# Patient Record
Sex: FEMALE | Race: WHITE | Employment: FULL TIME | URBAN - METROPOLITAN AREA
[De-identification: names, ages, dates, MRNs, and addresses within clinical notes are randomized per-mention and may not be internally consistent; named-entity substitution may affect disease eponyms.]

---

## 2020-10-26 ENCOUNTER — APPOINTMENT (OUTPATIENT)
Dept: GENERAL RADIOLOGY | Age: 35
End: 2020-10-26
Payer: COMMERCIAL

## 2020-10-26 ENCOUNTER — HOSPITAL ENCOUNTER (EMERGENCY)
Age: 35
Discharge: HOME | End: 2020-10-26
Payer: COMMERCIAL

## 2020-10-26 VITALS
DIASTOLIC BLOOD PRESSURE: 63 MMHG | HEART RATE: 83 BPM | WEIGHT: 165 LBS | HEIGHT: 66 IN | TEMPERATURE: 99 F | SYSTOLIC BLOOD PRESSURE: 128 MMHG | RESPIRATION RATE: 20 BRPM | BODY MASS INDEX: 26.52 KG/M2 | OXYGEN SATURATION: 97 %

## 2020-10-26 PROCEDURE — 6360000002 HC RX W HCPCS: Performed by: NURSE PRACTITIONER

## 2020-10-26 PROCEDURE — 99212 OFFICE O/P EST SF 10 MIN: CPT

## 2020-10-26 PROCEDURE — 96372 THER/PROPH/DIAG INJ SC/IM: CPT

## 2020-10-26 PROCEDURE — 72110 X-RAY EXAM L-2 SPINE 4/>VWS: CPT

## 2020-10-26 RX ORDER — NAPROXEN 500 MG/1
500 TABLET ORAL 2 TIMES DAILY
Qty: 14 TABLET | Refills: 0 | Status: SHIPPED | OUTPATIENT
Start: 2020-10-26 | End: 2022-08-22 | Stop reason: ALTCHOICE

## 2020-10-26 RX ORDER — CYCLOBENZAPRINE HCL 10 MG
10 TABLET ORAL 2 TIMES DAILY PRN
Qty: 10 TABLET | Refills: 0 | Status: SHIPPED | OUTPATIENT
Start: 2020-10-26 | End: 2020-10-31

## 2020-10-26 RX ORDER — KETOROLAC TROMETHAMINE 30 MG/ML
30 INJECTION, SOLUTION INTRAMUSCULAR; INTRAVENOUS ONCE
Status: COMPLETED | OUTPATIENT
Start: 2020-10-26 | End: 2020-10-26

## 2020-10-26 RX ADMIN — KETOROLAC TROMETHAMINE 30 MG: 30 INJECTION, SOLUTION INTRAMUSCULAR; INTRAVENOUS at 19:17

## 2020-10-26 ASSESSMENT — PAIN SCALES - GENERAL
PAINLEVEL_OUTOF10: 9
PAINLEVEL_OUTOF10: 5
PAINLEVEL_OUTOF10: 9

## 2020-10-26 ASSESSMENT — PAIN DESCRIPTION - ORIENTATION: ORIENTATION: LOWER

## 2020-10-26 ASSESSMENT — PAIN DESCRIPTION - LOCATION: LOCATION: BACK

## 2020-10-26 ASSESSMENT — PAIN DESCRIPTION - PAIN TYPE: TYPE: ACUTE PAIN

## 2020-10-26 NOTE — ED PROVIDER NOTES
HPI: Jerica Gould 38 y. o.female with   Past Medical History:   Diagnosis Date    Thyroid disease     who presents with a  lower back pain. The patient states that the back pain began 2 months ago she was sleeping on the floor with her dog that was sick. Mariama Jerez The history is obtained from the patient. . The patient states that the pain is moderate. It is worsened by movement including flexion and extension of the back as well as rotation. Patient denies any distal neurovascular complaints including numbness tingling or weakness. There are no bowel or bladder symptoms reported. The patient denies saddle or groin anesthesia. The patient also denies fevers, chills, weight loss, night sweats, IV drug use, or recent illness.        Review of Systems:   Pertinent positives and negatives are stated within HPI, all other systems reviewed and are negative.        --------------------------------------------- PAST HISTORY ---------------------------------------------  Past Medical History:  has a past medical history of Thyroid disease. Past Surgical History:  has no past surgical history on file. Social History:  reports that she has never smoked. She has never used smokeless tobacco.    Family History: family history is not on file. The patients home medications have been reviewed. Allergies: Augmentin [amoxicillin-pot clavulanate]      ------------------------- NURSING NOTES AND VITALS REVIEWED ---------------------------   The nursing notes within the ED encounter and vital signs as below have been reviewed by myself. /63   Pulse 83   Temp 99 °F (37.2 °C) (Infrared)   Resp 20   Ht 5' 6\" (1.676 m)   Wt 165 lb (74.8 kg)   LMP 10/19/2020   SpO2 97%   BMI 26.63 kg/m²   Oxygen Saturation Interpretation: Normal    The patients available past medical records and past encounters were reviewed. Physical exam:  Constitutional:  The patient is tearful due to the back pain.  Patient is alert and oriented x3. Head: Head is atraumatic and normocephalic. Eyes: There is no discharge from the eyes. Sclerae are normal.  ENT: The oropharynx is normal. The mouth is normal to inspection. Neck: Normal range of motion of the neck is present   Respiratory/chest: No resp distress  Cardiovascular: Regular rate   Skin: Skin is warm and dry,   Neurologic exam: The patient's Kirk Coma Scale is 15. No focal motor deficits. There are no focal sensory deficits. . Gait is normal. Symmetric strength and sensation in the lower extremities bilaterally. Back exam: The patient has no  reproducible tenderness to palpation in the paravertebral lumbar  There is no evidence of localized erythema nor is there any focal warmth to the back. The patient has no evidence of single vertebral tenderness or any single area of interspace tenderness. There are no palpable deformities, lacerations, abrasions, or stepoffs. No midline cervical, thoracic, or lumbar spine tenderness.           -------------------------------------------------- RESULTS -------------------------------------------------  I have personally reviewed all laboratory and imaging results for this patient. Results are listed below. LABS:  No results found for this visit on 10/26/20. RADIOLOGY:  Interpreted by Radiologist.  XR LUMBAR SPINE (MIN 4 VIEWS)   Final Result   Gentle levocurvature of the lumbar spine. Mild degenerative disc and facet   disease at L5-S1. Medical decision making:   Low back pain for 2 months. She does have an appointment with her spine specialist coming up on Friday which she said she can take for pain she has tried ibuprofen and Aleve without relief and also Salonpas lidocaine patches. She was given Toradol IM for the pain x-ray of the lumbar spine was obtained    Patient was reevaluated she said she did get a little relief from the Toradol and she is able to move her legs without pain in her back.   She said she feels much better. X-ray showed some degenerative changes and some levo curvature.   I discussed the results with her she will follow-up with her spine specialist I did order her Naprosyn to take twice a day and also some Flexeril and advised her to follow-up with her specialist if she develops any fever,  urinary incontinence,  numbness or tingling in the legs or saddle anesthesia she needs to go directly to the ED  Impression:  Lumbosacral Strain    Disposition:  Discharge    Condition:  Stable     SEJAL Bhatt - BRIAN  10/26/20 1948

## 2022-08-22 ENCOUNTER — HOSPITAL ENCOUNTER (EMERGENCY)
Age: 37
Discharge: HOME | End: 2022-08-22
Payer: COMMERCIAL

## 2022-08-22 VITALS
TEMPERATURE: 98.4 F | WEIGHT: 160 LBS | SYSTOLIC BLOOD PRESSURE: 125 MMHG | OXYGEN SATURATION: 100 % | DIASTOLIC BLOOD PRESSURE: 85 MMHG | BODY MASS INDEX: 26.66 KG/M2 | HEART RATE: 111 BPM | HEIGHT: 65 IN | RESPIRATION RATE: 20 BRPM

## 2022-08-22 DIAGNOSIS — M54.50 RIGHT-SIDED LOW BACK PAIN WITHOUT SCIATICA, UNSPECIFIED CHRONICITY: Primary | ICD-10-CM

## 2022-08-22 PROCEDURE — 96372 THER/PROPH/DIAG INJ SC/IM: CPT

## 2022-08-22 PROCEDURE — 6360000002 HC RX W HCPCS: Performed by: NURSE PRACTITIONER

## 2022-08-22 PROCEDURE — 99211 OFF/OP EST MAY X REQ PHY/QHP: CPT

## 2022-08-22 RX ORDER — KETOROLAC TROMETHAMINE 30 MG/ML
30 INJECTION, SOLUTION INTRAMUSCULAR; INTRAVENOUS ONCE
Status: COMPLETED | OUTPATIENT
Start: 2022-08-22 | End: 2022-08-22

## 2022-08-22 RX ORDER — TRAMADOL HYDROCHLORIDE 50 MG/1
50 TABLET ORAL EVERY 6 HOURS PRN
Qty: 12 TABLET | Refills: 0 | Status: SHIPPED | OUTPATIENT
Start: 2022-08-22 | End: 2022-08-25

## 2022-08-22 RX ORDER — MENTHOL 40 MG/ML
GEL TOPICAL
Qty: 1 EACH | Refills: 0 | Status: SHIPPED | OUTPATIENT
Start: 2022-08-22

## 2022-08-22 RX ORDER — CYCLOBENZAPRINE HCL 10 MG
10 TABLET ORAL 2 TIMES DAILY PRN
Qty: 14 TABLET | Refills: 0 | Status: SHIPPED | OUTPATIENT
Start: 2022-08-22 | End: 2022-08-29

## 2022-08-22 RX ORDER — ACETAMINOPHEN 500 MG
1000 TABLET ORAL EVERY 6 HOURS PRN
COMMUNITY

## 2022-08-22 RX ORDER — DEXAMETHASONE SODIUM PHOSPHATE 10 MG/ML
10 INJECTION, SOLUTION INTRAMUSCULAR; INTRAVENOUS ONCE
Status: COMPLETED | OUTPATIENT
Start: 2022-08-22 | End: 2022-08-22

## 2022-08-22 RX ORDER — NAPROXEN 500 MG/1
500 TABLET ORAL 2 TIMES DAILY PRN
Qty: 14 TABLET | Refills: 0 | Status: SHIPPED | OUTPATIENT
Start: 2022-08-22 | End: 2022-08-29

## 2022-08-22 RX ADMIN — KETOROLAC TROMETHAMINE 30 MG: 30 INJECTION, SOLUTION INTRAMUSCULAR at 09:44

## 2022-08-22 RX ADMIN — DEXAMETHASONE SODIUM PHOSPHATE 10 MG: 10 INJECTION, SOLUTION INTRAMUSCULAR; INTRAVENOUS at 09:45

## 2022-08-22 ASSESSMENT — PAIN DESCRIPTION - PAIN TYPE
TYPE: ACUTE PAIN;CHRONIC PAIN
TYPE: ACUTE PAIN;CHRONIC PAIN

## 2022-08-22 ASSESSMENT — PAIN DESCRIPTION - FREQUENCY
FREQUENCY: CONTINUOUS
FREQUENCY: CONTINUOUS

## 2022-08-22 ASSESSMENT — PAIN DESCRIPTION - ORIENTATION
ORIENTATION: RIGHT;LEFT;LOWER
ORIENTATION: RIGHT;LEFT;LOWER

## 2022-08-22 ASSESSMENT — PAIN DESCRIPTION - LOCATION
LOCATION: BACK
LOCATION: BACK

## 2022-08-22 ASSESSMENT — PAIN DESCRIPTION - ONSET
ONSET: GRADUAL
ONSET: ON-GOING

## 2022-08-22 ASSESSMENT — PAIN DESCRIPTION - DESCRIPTORS
DESCRIPTORS: DISCOMFORT;SPASM;SHOOTING
DESCRIPTORS: DISCOMFORT;SPASM;SHOOTING

## 2022-08-22 ASSESSMENT — PAIN SCALES - GENERAL
PAINLEVEL_OUTOF10: 9
PAINLEVEL_OUTOF10: 9
PAINLEVEL_OUTOF10: 8

## 2022-08-22 ASSESSMENT — PAIN - FUNCTIONAL ASSESSMENT: PAIN_FUNCTIONAL_ASSESSMENT: 0-10

## 2022-08-22 NOTE — ED PROVIDER NOTES
Department of Emergency [address]  Provider Note  Admit Date/Time: 2022  9:15 AM  Room:   NAME: Mary Kate Jaquez  : 1985  MRN: [id number]     Chief Complaint:  Back Pain (Has history of back pain and 3 days ago it started flaring up again. Denies current injury. Having pain across lower back.)    History of Present Illness        Mary Kate Jaquez is a 44 y.o. female who has a past medical history of:   Past Medical History:   Diagnosis Date    Back pain     Thyroid disease     presents to the urgent care center by private car for low back pain, She cannot walk without pain. She has had this for three days. She is not complaining of fever, urinary symptoms, saddle anesthesia, weakness or numbness, or any other complaints. She has had this before, sees a back specialist, and has done PT.   ROS    Pertinent positives and negatives are stated within HPI, all other systems reviewed and are negative. Past Surgical History:   Procedure Laterality Date    COSMETIC SURGERY      TONSILLECTOMY     Social History:  reports that she has never smoked. She has never used smokeless tobacco.  Family History: family history is not on file. Allergies: Augmentin [amoxicillin-pot clavulanate]    Physical Exam   Oxygen Saturation Interpretation: Normal.   ED Triage Vitals [22 0918]   BP Temp Temp Source Heart Rate Resp SpO2 Height Weight   125/85 98.4 °F (36.9 °C) Infrared (!) 120 20 100 % 5' 5\" (1.651 m) 160 lb (72.6 kg)       Physical Exam  Constitutional/General: Alert and oriented x3, crying and anxious,   HEENT:  NC/NT. Clear conjunctivaA irway patent. Neck: Supple, full ROM,   Respiratory: Lungs clear to auscultation bilaterally, no wheezes, rales, or rhonchi. Not in respiratory distress  CV:  tachycardia,  Regular rhythm. No murmurs, gallops, or rubs.  2+ distal pulses  Musculoskeletal: Moves all extremities x 4.  Walking with a walker because she said her back hurts she has 5+ strength in the bilateral lower extremities she has normal patellar reflexes she has normal tactile touch sensation in her inner thighs bilaterally. She is able to dorsiflex her great toes and ankles flex and extend her knees without difficulty pain with straight leg raising at 10 degrees on the right. No midline lumbar thoracic spinal tenderness to palpation. She has normal patellar reflexes. Integument: skin warm and dry. No rashes. Lymphatic: no lymphadenopathy noted  Neurologic: GCS 15, no focal deficits,   Psychiatric: Normal Affect    Lab / Imaging Results   (All laboratory and radiology results have been personally reviewed by myself)  Labs:  No results found for this visit on 08/22/22. Imaging: All Radiology results interpreted by Radiologist unless otherwise noted. No orders to display       ED Course / Medical Decision Making     Medications   ketorolac (TORADOL) injection 30 mg (30 mg IntraMUSCular Given 8/22/22 0944)   dexamethasone (PF) (DECADRON) injection 10 mg (10 mg Oral Given 8/22/22 0945)          Consult(s):   None      MDM:   SHe has low back pain flared up she has had this in the past she is having trouble walking because it hurts in the low back when she walks. Neurovascular checks are normal.  She was given Toradol IM and also Decadron orally. Her heart rate initially was 120 but she is anxious tearful and crying. Her heart rate was checked after the Toradol and Decadron were given she was down to 111 she continues to be tearful and crying due to the back pain. I did check labs on her to make sure there were no signs of an infection. Her neuro exam is normal, I rechecked her temperature is still 98 4. She says she has not been running fevers does not have any other symptoms.   Wanted to do blood work to check to make sure she did not have an elevation in her white count or any signs of infection and she declined she said she has too many medical bills now and this  put her into more hysteria and crying. Her heart rate is  up most likely because of the tearfulness and the anxiety. She does not have a fever she does not have any respiratory symptoms there is no outward signs of any infection. I advised her if she develops fever, urinary symptoms or worsening back pain or numbness tingling or weakness in her groin or legs she needs to go directly to the emergency department. I did put her on some Naprosyn for the pain and some Flexeril, some tramadol to take if the if its not relieved with the Naprosyn and also some Biofreeze she should see her back specialist that she sees for this soon as possible go to the ED if any further symptoms. Or if the pain is uncontrolled the medications        Plan of Care/Counseling:  I reviewed today's visit with the patient in addition to providing specific details for the plan of care and counseling regarding the diagnosis and prognosis. Questions are answered at this time and are agreeable with the plan. Assessment      1. Right-sided low back pain without sciatica, unspecified chronicity      Plan   Discharge to home and advised to contact call [facility] referral line to get established with a Dr  [phone]        follow up with your back Dr    Schedule an appointment as soon as possible for a visit    Patient condition is good    New Medications     Discharge Medication List as of 8/22/2022 10:37 AM        START taking these medications    Details   cyclobenzaprine (FLEXERIL) 10 MG tablet Take 1 tablet by mouth 2 times daily as needed for Muscle spasms, Disp-14 tablet, R-0Print      naproxen (NAPROSYN) 500 MG tablet Take 1 tablet by mouth 2 times daily as needed for Pain, Disp-14 tablet, R-0Print      traMADol (ULTRAM) 50 MG tablet Take 1 tablet by mouth every 6 hours as needed for Pain for up to 3 days.  Take if pain not relieved with naprosyn, Disp-12 tablet, R-0Print      Menthol, Topical Analgesic, (BIOFREEZE) 4 % GEL Apply to painful areas on back BID PRN, Disp-1 each, R-0Print           Electronically signed by SEJAL Michel CNP   DD: 8/22/22  **This report was transcribed using voice recognition software. Every effort was made to ensure accuracy; however, inadvertent computerized transcription errors may be present.   END OF ED PROVIDER NOTE      SEJAL Michel CNP  08/22/22 1129

## 2022-08-22 NOTE — Clinical Note
Car Arellano was seen and treated in our emergency department on 8/22/2022. She may return to work on 08/23/2022. If you have any questions or concerns, please don't hesitate to call.       Brandie Daniel, APRN - CNP